# Patient Record
Sex: MALE | Race: WHITE | NOT HISPANIC OR LATINO | Employment: OTHER | URBAN - METROPOLITAN AREA
[De-identification: names, ages, dates, MRNs, and addresses within clinical notes are randomized per-mention and may not be internally consistent; named-entity substitution may affect disease eponyms.]

---

## 2018-05-21 RX ORDER — LORAZEPAM 0.5 MG/1
0.5 TABLET ORAL EVERY 8 HOURS PRN
COMMUNITY

## 2018-05-21 RX ORDER — EZETIMIBE 10 MG/1
10 TABLET ORAL DAILY
COMMUNITY

## 2018-05-21 RX ORDER — NYSTATIN 100000 U/G
CREAM TOPICAL 2 TIMES DAILY
COMMUNITY

## 2018-05-21 RX ORDER — FERROUS SULFATE 325(65) MG
325 TABLET ORAL
COMMUNITY

## 2018-05-21 RX ORDER — POTASSIUM CHLORIDE 20 MEQ/1
20 TABLET, EXTENDED RELEASE ORAL 2 TIMES DAILY
COMMUNITY

## 2018-05-21 RX ORDER — ALLOPURINOL 300 MG/1
300 TABLET ORAL DAILY
COMMUNITY

## 2018-05-21 RX ORDER — LANOLIN ALCOHOL/MO/W.PET/CERES
1000 CREAM (GRAM) TOPICAL DAILY
COMMUNITY

## 2018-05-21 RX ORDER — IBUPROFEN 600 MG/1
600 TABLET ORAL EVERY 6 HOURS PRN
COMMUNITY

## 2018-05-21 RX ORDER — CHOLECALCIFEROL (VITAMIN D3) 50 MCG
2000 TABLET ORAL DAILY
COMMUNITY

## 2018-05-21 RX ORDER — DOCUSATE SODIUM 100 MG/1
100 CAPSULE, LIQUID FILLED ORAL 2 TIMES DAILY
COMMUNITY

## 2018-05-21 RX ORDER — SIMVASTATIN 20 MG
20 TABLET ORAL NIGHTLY
COMMUNITY

## 2018-05-21 RX ORDER — FUROSEMIDE 20 MG/1
20 TABLET ORAL 2 TIMES DAILY
COMMUNITY

## 2018-05-22 ENCOUNTER — OFFICE VISIT (OUTPATIENT)
Dept: NEUROSURGERY | Facility: CLINIC | Age: 83
End: 2018-05-22

## 2018-05-22 VITALS
TEMPERATURE: 98.7 F | BODY MASS INDEX: 21.22 KG/M2 | DIASTOLIC BLOOD PRESSURE: 70 MMHG | HEIGHT: 68 IN | SYSTOLIC BLOOD PRESSURE: 130 MMHG | WEIGHT: 140 LBS

## 2018-05-22 DIAGNOSIS — R29.898 LEG WEAKNESS, BILATERAL: Primary | ICD-10-CM

## 2018-05-22 DIAGNOSIS — G31.9 BRAIN ATROPHY (HCC): ICD-10-CM

## 2018-05-22 DIAGNOSIS — R26.89 SHUFFLING GAIT: ICD-10-CM

## 2018-05-22 PROCEDURE — 99203 OFFICE O/P NEW LOW 30 MIN: CPT | Performed by: NEUROLOGICAL SURGERY

## 2018-05-22 NOTE — PROGRESS NOTES
"Dr. Jones Lal  1/13/1927  [id number]      Chief Complaint   Patient presents with   • Leg Pain   • hydrocephalus       HISTORY OF PRESENT ILLNESS:   This is a 89-year-old presenting for consideration of the diagnosis of normal pressure hydrocephalus.  He has had dysfunction of gait for several years associated with bilateral foot drop.  Within recent months his gait has become much more difficult, shuffling, and a sense of generalized fatigue.  He is not as energetic as he has been in the past.  He denies headache, back pain or leg pain.  In the course of his workup a CT scan was performed of the head and was interpreted as showing mild ventricular enlargement and he is referred for neurosurgical consultation with a questionable diagnosis of in in [facility].    The symptoms with which he presents are not those associated with normal pressure hydrocephalus.  His gait disturbances more related to his bilateral foot drop associated with degenerative osteoarthritis and debilitation of age and it is the apraxic gait associated with normal pressure hydrocephalus.  He does not have repetitive urinary incontinence although he is certainly has hesitancy at times and will have \"accidents\".  His memory function seems to be adequate for his age and consistent with that.  He does not have the cognitive dysfunction associated with normal pressure hydrocephalus, either.    The CT scan shows mild ventricular enlargement.  He also has commensurate atrophy therefore I think this is not hydrocephalus rather ventricular enlargement secondary to generalized cortical atrophy.    Past Medical History:   Diagnosis Date   • Gout    • History of transfusion    • Hyperlipidemia    • Hypertension        Past Surgical History:   Procedure Laterality Date   • HIP SURGERY     • PACEMAKER IMPLANTATION     • REPLACEMENT TOTAL KNEE Bilateral        Family History   Problem Relation Age of Onset   • Dementia Mother    • Dementia Father    • Dementia " Brother        Social History     Social History   • Marital status:      Spouse name: N/A   • Number of children: N/A   • Years of education: N/A     Occupational History   • Not on file.     Social History Main Topics   • Smoking status: Never Smoker   • Smokeless tobacco: Never Used   • Alcohol use No   • Drug use: No   • Sexual activity: Defer     Other Topics Concern   • Not on file     Social History Narrative   • No narrative on file       Allergies   Allergen Reactions   • Penicillins Unknown (See Comments)     Ask patient         Current Outpatient Prescriptions:   •  allopurinol (ZYLOPRIM) 300 MG tablet, Take 300 mg by mouth Daily., Disp: , Rfl:   •  Cholecalciferol (VITAMIN D) 2000 units tablet, Take 2,000 Units by mouth Daily., Disp: , Rfl:   •  docusate sodium (COLACE) 100 MG capsule, Take 100 mg by mouth 2 (Two) Times a Day., Disp: , Rfl:   •  ezetimibe (ZETIA) 10 MG tablet, Take 10 mg by mouth Daily., Disp: , Rfl:   •  ferrous sulfate 325 (65 FE) MG tablet, Take 325 mg by mouth Daily With Breakfast., Disp: , Rfl:   •  furosemide (LASIX) 20 MG tablet, Take 20 mg by mouth 2 (Two) Times a Day., Disp: , Rfl:   •  ibuprofen (ADVIL,MOTRIN) 600 MG tablet, Take 600 mg by mouth Every 6 (Six) Hours As Needed for Mild Pain ., Disp: , Rfl:   •  LORazepam (ATIVAN) 0.5 MG tablet, Take 0.5 mg by mouth Every 8 (Eight) Hours As Needed for Anxiety., Disp: , Rfl:   •  Memantine HCl-Donepezil HCl (NAMZARIC) 28-10 MG capsule sustained-release 24 hr, Take  by mouth., Disp: , Rfl:   •  Menthol-Zinc Oxide (CALMODROX) 0.44-20 % ointment, Apply  topically., Disp: , Rfl:   •  methylcellulose, Laxative, (CITRUCEL) 500 MG tablet tablet, Take 2 tablets by mouth Every 4 (Four) Hours As Needed., Disp: , Rfl:   •  nystatin (MYCOSTATIN) 824046 UNIT/GM cream, Apply  topically 2 (Two) Times a Day., Disp: , Rfl:   •  potassium chloride (K-DUR,KLOR-CON) 20 MEQ CR tablet, Take 20 mEq by mouth 2 (Two) Times a Day., Disp: , Rfl:   •   simvastatin (ZOCOR) 20 MG tablet, Take 20 mg by mouth Every Night., Disp: , Rfl:   •  vitamin B-12 (CYANOCOBALAMIN) 1000 MCG tablet, Take 1,000 mcg by mouth Daily., Disp: , Rfl:     Review of Systems   Constitutional: Negative for activity change, appetite change, chills, diaphoresis, fatigue, fever and unexpected weight change.   HENT: Negative for congestion, dental problem, drooling, ear discharge, ear pain, facial swelling, hearing loss, mouth sores, nosebleeds, postnasal drip, rhinorrhea, sinus pressure, sneezing, sore throat, tinnitus, trouble swallowing and voice change.    Eyes: Negative for photophobia, pain, discharge, redness, itching and visual disturbance.   Respiratory: Negative for apnea, cough, choking, chest tightness, shortness of breath, wheezing and stridor.    Cardiovascular: Negative for chest pain, palpitations and leg swelling.   Gastrointestinal: Negative for abdominal distention, abdominal pain, anal bleeding, blood in stool, constipation, diarrhea, nausea, rectal pain and vomiting.   Endocrine: Negative for cold intolerance, heat intolerance, polydipsia, polyphagia and polyuria.   Genitourinary: Positive for flank pain. Negative for decreased urine volume, difficulty urinating, dysuria, enuresis, frequency, genital sores, hematuria and urgency.   Musculoskeletal: Positive for arthralgias, back pain and gait problem. Negative for joint swelling, myalgias, neck pain and neck stiffness.   Skin: Negative for color change, pallor, rash and wound.   Allergic/Immunologic: Negative for environmental allergies, food allergies and immunocompromised state.   Neurological: Positive for weakness. Negative for dizziness, tremors, seizures, syncope, facial asymmetry, speech difficulty, light-headedness, numbness and headaches.   Hematological: Negative for adenopathy. Bruises/bleeds easily.   Psychiatric/Behavioral: Positive for agitation, confusion, decreased concentration, dysphoric mood, sleep  "disturbance and suicidal ideas. Negative for behavioral problems, hallucinations and self-injury. The patient is nervous/anxious. The patient is not hyperactive.        Vitals:    05/22/18 1607   BP: 130/70   Temp: 98.7 °F (37.1 °C)   Weight: 63.5 kg (140 lb)   Height: 172.7 cm (68\")       Neurological Examination:    Mental status/speech: The patient is alert and oriented.  Speech is clear without aphysia or dysarthria.  No overt cognitive deficits.    Cranial nerve examination:    Olfaction: Smell is intact.  Vision: Vision is intact without visual field abnormalities.  Funduscopic examination is normal.  No pupillary irregularity.  Ocular motor examination: The extraocular muscles are intact.  There is no diplopia.  The pupil is round and reactive to both light and accommodation.  There is no nystagmus.  Facial movement/sensation: There is no facial weakness.  Sensation is intact in the first, second, and third divisions of the trigeminal nerve.  The corneal reflex is intact.  Auditory: Hearing is intact to finger rub bilaterally.  Cranial nerves IX, X, XI, XII: Phonation is normal.  No dysphagia.  Tongue is protruded in the midline without atrophy.  The gag reflex is intact.  Shoulder shrug is normal.    Musculoligamentous ligamentous examination: Generalized weakness.  He has bilateral foot drop.  He is hyporeflexic in the upper or lower extremities.  Straight leg raising is negative.  He has generalized weakness.    Medical Decision Making:     Diagnostic Data Set:  I reviewed his data set in detail.  He has mild ventricular enlargement associated with a cortical atrophy.  He has significant degenerative osteoarthritis in the lumbar spine associated with facet hypertrophy disc space and narrowing and some scoliosis.      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  His foot drop is persistent and permanent.  His generalized fatigue and weakness may well be on a cardiac basis rather than neurologic.  I certainly cannot ascribe changes in the CT scan with generalized weakness.  His exam is nonfocal.  I've discussed this in detail with his family.  I really don't think there is any recommendations that would be forthcoming that would be of any meaningful sense to them.  I have assured him they have done the \"right thank\" and should be commended.        I greatly appreciate the opportunity to see and evaluate this individual.  If you have questions or concerns regarding issues that I may have overlooked please call me at any time: [phone].  Ger Peters M.D.  [facility]  [address] [place] [address]      Scribed for Bam Peters MD by Sona Rosas CMA. 5/22/2018  4:19 PM    I have read and concur with the information provided by the scribe.  Bam Peters MD    "

## 2018-05-23 ENCOUNTER — TELEPHONE (OUTPATIENT)
Dept: NEUROSURGERY | Facility: CLINIC | Age: 83
End: 2018-05-23

## 2018-05-23 NOTE — TELEPHONE ENCOUNTER
"Violet Marie (Daughter in law) called and asked if there is anyway you would be willing to set up a time to call and speak w/ her and her  Sheng (pt's son & POA) - They were in office yesterday w/ the pt.     -Stated  doesn't like them speaking about the \"what if's\" and \"nursing home\" So they are requesting to speak with you.       -The reason they would like to make a \"set time\" is so they can both be together when you call.         "